# Patient Record
Sex: FEMALE | Race: WHITE | NOT HISPANIC OR LATINO | RURAL
[De-identification: names, ages, dates, MRNs, and addresses within clinical notes are randomized per-mention and may not be internally consistent; named-entity substitution may affect disease eponyms.]

---

## 2017-01-09 DIAGNOSIS — N92.0 EXCESSIVE OR FREQUENT MENSTRUATION: ICD-10-CM

## 2017-01-09 DIAGNOSIS — E66.9 OBESITY, UNSPECIFIED: ICD-10-CM

## 2017-01-09 DIAGNOSIS — D75.89 MACROCYTOSIS: Primary | ICD-10-CM

## 2017-01-09 DIAGNOSIS — Z13.6 SCREENING FOR ISCHEMIC HEART DISEASE: ICD-10-CM

## 2017-02-21 PROBLEM — Z00.00 WELLNESS EXAMINATION: Status: ACTIVE | Noted: 2017-02-21

## 2017-02-23 ENCOUNTER — OFFICE VISIT (OUTPATIENT)
Dept: FAMILY MEDICINE CLINIC | Facility: CLINIC | Age: 27
End: 2017-02-23

## 2017-02-23 VITALS
OXYGEN SATURATION: 99 % | TEMPERATURE: 99.2 F | HEIGHT: 59 IN | DIASTOLIC BLOOD PRESSURE: 68 MMHG | BODY MASS INDEX: 27.17 KG/M2 | SYSTOLIC BLOOD PRESSURE: 112 MMHG | HEART RATE: 70 BPM | WEIGHT: 134.8 LBS | RESPIRATION RATE: 16 BRPM

## 2017-02-23 DIAGNOSIS — F79 MENTAL RETARDATION: Chronic | ICD-10-CM

## 2017-02-23 DIAGNOSIS — Z00.00 ROUTINE CHECK-UP: Primary | ICD-10-CM

## 2017-02-23 DIAGNOSIS — I38 VALVULAR HEART DISEASE: Chronic | ICD-10-CM

## 2017-02-23 DIAGNOSIS — Q90.9 DS (DOWN'S SYNDROME): Chronic | ICD-10-CM

## 2017-02-23 PROCEDURE — 99213 OFFICE O/P EST LOW 20 MIN: CPT | Performed by: FAMILY MEDICINE

## 2017-02-23 RX ORDER — ACETAMINOPHEN AND CODEINE PHOSPHATE 120; 12 MG/5ML; MG/5ML
1 SOLUTION ORAL TAKE AS DIRECTED
COMMUNITY
Start: 2017-02-10

## 2017-02-23 NOTE — PROGRESS NOTES
"Subjective   Guillermina Mcintosh is a 26 y.o. female presenting with chief complaint of:   Chief Complaint   Patient presents with   • Follow-up     \"think she needs labs too?\"       History of Present Illness :  With aide.  Has multiple chronic problems; interval appointment.  One of these problems is Down's. With this mental retardation, congenital/valvular heart.  2m appoitnment/regulatory visit.       Other chronic problem/s to consider:  Allergic rhinitis:  This has been present for years/over a year.  The nasal/sinus congestion on/off has been present for years and is currently stable/ same as usual.   Chronic Otitis:  Has had since childhood. Has had many ENT/PE tubes.  Has hearing loss/aide related to.  Currently no ear pain, drainage.    Has an acute issue today: needs labs today    The following portions of the patient's history were reviewed and updated as appropriate: allergies, current medications, past family history, past medical history, past social history, past surgical history and problem list.  TCC migrated if needed/reviewed.      Current Outpatient Prescriptions:   •  Calcium Carbonate-Simethicone (MAALOX MAX PO), Take 15 mL by mouth 2 (Two) Times a Day As Needed., Disp: , Rfl:   •  fluticasone (FLONASE) 50 MCG/ACT nasal spray, 2 sprays into each nostril daily. Administer 2 sprays in each nostril for each dose., Disp: , Rfl:   •  norethindrone (MICRONOR) 0.35 MG tablet, Take 1 tablet by mouth Take As Directed., Disp: , Rfl:     No Known Allergies    Review of Systems  GENERAL:  Active/slower with limits, speed, samni for understanding, stature. Sleep is ok. No fever.  ENDO:  No syncope, near or diaphoretic sweaty spells..  HEENT: No head injury  headache,  No vision change,  Same hearing loss.  Ears without pain/drainage.  No sore throat.  No nasal/sinus congestion/drainage. No epistaxis.  CHEST: No chest wall tenderness or mass. No cough,  without wheeze, SOB; no hemoptysis.  CV: No chest pain, " palpatations, ankle edema.  GI: No heartburn, dysphagia.  No abdominal pain, diarrhea, constipation, rectal bleeding, or melena.    :  Voids without dysuria, or incontience to completion.  ORTHO: No painful/swollen joints but various on /off sore.  No  sore neck or back.  No acute neck or back pain without recent injury.   NEURO: No dizziness, weakness of extremities.  No numbness/parethesias.   PSYCH: Mod/major memory loss.  Mood good; does not appear anxious, depressed but/and not suicidal.  Tolerated stress.     Results for orders placed or performed in visit on 09/06/16   Basic metabolic panel   Result Value Ref Range    Glucose 91 70 - 100 mg/dL    BUN 15 5 - 21 mg/dL    Creatinine 0.88 0.5 - 1.4 mg/dL    eGFR Non African Am 106 mL/min/1.732    eGFR African Am 129 mL/min/1.732    BUN/Creatinine Ratio 17.0     Sodium 139 135 - 145 mmol/L    Potassium 4.0 3.5 - 5.3 mmol/L    Chloride 101 98 - 110 mmol/L    Total CO2 27 24 - 31 mmol/L    Calcium 9.1 8.4 - 10.4 mg/dL   CBC and Differential   Result Value Ref Range    WBC 6.21 4.80 - 10.80 K/mcL    RBC 4.41 (L) 4.80 - 5.90 M/mcL    Hemoglobin 14.7 14.0 - 18.0 g/dL    Hematocrit 43.3 40.0 - 52.0 %    MCV 98.2 (H) 82.0 - 95.0 fL    MCH 33.3 (H) 28.0 - 32.0 pg    MCHC 33.9 33.0 - 36.0 gm/dL    RDW 13.4 12 - 15 %    Platelets 247 130 - 400 K/mcL    Neutrophil Rel % 68.70 39.00 - 78.00 %    Lymphocyte Rel % 20.90 15.00 - 45.00 %    Monocyte Rel % 7.70 4.00 - 12.00 %    Eosinophil Rel % 1.90 0.00 - 4.00 %    Basophil Rel % 0.50 0.00 - 2.00 %    Neutrophils Absolute 4.26 1.87 - 8.40 K/mcL    Lymphocytes Absolute 1.30 0.72 - 4.86 K/mcL    Monocytes Absolute 0.48 0.19 - 1.30 K/mcL    Eosinophils Absolute 0.12 0.00 - 0.70 K/mcL    Basophils Absolute 0.03 0.00 - 0.20 K/mcL    Immature Grans Absolute 0.02 K/mcL    Hematology Comments: Comment        No results found for: HGBA1C    Lab Results   Component Value Date     09/06/2016    K 4.0 09/06/2016     09/06/2016  "   CO2 27 09/06/2016    BUN 15 09/06/2016    CREATININE 0.88 09/06/2016    CALCIUM 9.1 09/06/2016       No results found for: PSA     Objective   Visit Vitals   • /68 (BP Location: Right arm, Patient Position: Sitting, Cuff Size: Small Adult)   • Pulse 70   • Temp 99.2 °F (37.3 °C) (Oral)   • Resp 16   • Ht 59\" (149.9 cm)   • Wt 134 lb 12.8 oz (61.1 kg)   • LMP 01/30/2017 (Exact Date)   • SpO2 99%   • Breastfeeding No   • BMI 27.23 kg/m2       Physical Exam  GENERAL:  Well nourished/developed in no acute distress. Obese.  Down's appearance.   SKIN: Turgor excellent, without wound, rash, lesion.  HEENT: Normal cephalic without trauma.  Pupils equal round reactive to light. Extraocular motions full without nystagmus.   External canals nonobstructive nontender without reddness. Tymphatic membranes devonte/scarred with gian structures intact.   Oral cavity without growths, exudates, and moist.  Posterior pharnyx without mass, obstruction, reddness.  No thyroidmegaly, mass, tenderness, lymphadenopathy and supple.  CV: Regular rhythm.  1/6 systolic murmur without gallop,  edema. Posterior pulses intact.  No carotid bruits.  CHEST: No chest wall tenderness or mass.   LUNGS: Symmetric motion with clear to auscultation.   ABD: Soft, nontender without mass.   ORTHO: Symmetric extremities without swelling/point tenderness.  Full gross range of motion.  Short extremities.  NEURO: CN 2-12 grossly intact.  Symmetric facies. 14 x biceps  equal reflexes.  UE/LE   3/5 strength throughout.  Nonfocal use extremities. Speech mildly slurred/nasal.     PSYCH: Oriented x 3.  Pleasant calm, well kept.  Nonpurposeful- not directed conservation.    Assessment/Plan     1. Routine check-up  No overall change    2. Valvular heart disease  Cardiology followed/asymptomatic    3. DS (Down's syndrome)    4. Mental retardation  stable      Rx: reviewed.  Changes if above; none  LAB: reviewed/above, and if orders; today    Follow up: Return for " staff to call; will find out if needed 2m/vs 6.

## 2017-07-06 ENCOUNTER — OFFICE VISIT (OUTPATIENT)
Dept: FAMILY MEDICINE CLINIC | Facility: CLINIC | Age: 27
End: 2017-07-06

## 2017-07-06 VITALS
OXYGEN SATURATION: 97 % | BODY MASS INDEX: 27.42 KG/M2 | HEART RATE: 69 BPM | DIASTOLIC BLOOD PRESSURE: 72 MMHG | WEIGHT: 136 LBS | HEIGHT: 59 IN | SYSTOLIC BLOOD PRESSURE: 110 MMHG | RESPIRATION RATE: 20 BRPM | TEMPERATURE: 98.5 F

## 2017-07-06 DIAGNOSIS — Q90.9 DS (DOWN'S SYNDROME): Chronic | ICD-10-CM

## 2017-07-06 DIAGNOSIS — E55.9 VITAMIN D DEFICIENCY: ICD-10-CM

## 2017-07-06 DIAGNOSIS — I38 VALVULAR HEART DISEASE: Primary | Chronic | ICD-10-CM

## 2017-07-06 DIAGNOSIS — F79 MENTAL RETARDATION: Chronic | ICD-10-CM

## 2017-07-06 PROCEDURE — 99213 OFFICE O/P EST LOW 20 MIN: CPT | Performed by: FAMILY MEDICINE

## 2017-07-06 NOTE — PROGRESS NOTES
Subjective   Guillermina Mcintosh is a 27 y.o. female presenting with chief complaint of:   Chief Complaint   Patient presents with   • Annual Exam     Annual physical exam       History of Present Illness :  [facility]   Has multiple chronic problems; interval appointment.  One of these problems is Down's syndrome. This comes with a level of mental retardation and cognitive inabilities.  She is able to participate in the facility events and limited program employment.  She is pleased that she has a boyfriend.     Other chronic problem/s to consider:   MR: This has been present for over a year/it is chronic.  It is not worsening/progressive.  It is tolerated despite memory loss and cognetive decline. No falls  Congential cardiac disease: This has been present for years/over a year. It is chronic.  The problem is primarily congential;  Post repair AVSD  This is not associated with syncope/near syncope, dizziness, or weakness. [facility]   Allergic rhinitis:  This has been present for years/over a year.  The nasal/sinus congestion on/off has been present for years and is currently stable/ same as usual.   Gait decline: This has been present for years/over a year.  It is chronic.  It is influenced by strength/balance; no falls.   Vit D def: Her last lab work showed a low vitamin D and she has been on replacement since.      The following portions of the patient's history were reviewed and updated as appropriate: allergies, current medications, past family history, past medical history, past social history, past surgical history and problem list.  TCC migrated if needed/reviewed.      Current Outpatient Prescriptions:   •  Calcium Carbonate-Simethicone (MAALOX MAX PO), Take 15 mL by mouth 2 (Two) Times a Day As Needed., Disp: , Rfl:   •  fluticasone (FLONASE) 50 MCG/ACT nasal spray, 2 sprays into each nostril daily. Administer 2 sprays in each nostril  for each dose., Disp: , Rfl:   •  norethindrone (MICRONOR) 0.35 MG tablet, Take 1 tablet by mouth Take As Directed., Disp: , Rfl:   •  VITAMIN D, CHOLECALCIFEROL, PO, Take  by mouth Daily., Disp: , Rfl:     No Known Allergies    Review of Systems  GENERAL:  Active/slower with limits, speed, samni for age and congition. Sleep is ok.   ENDO:  No syncope, near or diaphoretic sweaty spells.  HEENT: No head injury pr headache,  No vision change,  Same hearing loss.  Ears without pain/drainage.  No sore throat.  No change occ nasal/sinus congestion/drainage. No epistaxis.  CHEST: No chest wall tenderness or mass. No cough,  without wheeze, SOB; no hemoptysis.  CV: No chest pain, palpatations, ankle edema.  GI: No heartburn, dysphagia.  No abdominal pain, diarrhea, constipation, rectal bleeding, or melena.    :  Voids without dysuria, or incontience to completion.  ORTHO: No painful/swollen joints but various on /off sore.  No sore neck or back.  No acute neck or back pain without recent injury.   NEURO: No dizziness, weakness of extremities.  No numbness/parethesias.   PSYCH: Mod  memory loss.  Mood good; not appearing anxious, depressed but/and not suicidal.  Tolerated stress.     Results for orders placed or performed in visit on 02/27/17   Comprehensive Metabolic Panel   Result Value Ref Range    Glucose 85 70 - 100 mg/dL    BUN 13 5 - 21 mg/dL    Creatinine 0.86 0.50 - 1.40 mg/dL    eGFR Non African Am 81 >60 mL/min/1.73    eGFR African Am 98 >60 mL/min/1.73    BUN/Creatinine Ratio 15.1 7.0 - 25.0    Sodium 141 135 - 145 mmol/L    Potassium 4.5 3.5 - 5.3 mmol/L    Chloride 107 98 - 110 mmol/L    Total CO2 22.0 (L) 24.0 - 31.0 mmol/L    Calcium 9.2 8.4 - 10.4 mg/dL    Total Protein 7.2 6.3 - 8.7 g/dL    Albumin 3.90 3.50 - 5.00 g/dL    Globulin 3.3 gm/dL    A/G Ratio 1.2 1.1 - 2.5 g/dL    Total Bilirubin 0.9 0.1 - 1.0 mg/dL    Alkaline Phosphatase 62 24 - 120 U/L    AST (SGOT) 27 7 - 45 U/L    ALT (SGPT) 29 0 - 54  U/L   Lipid Panel   Result Value Ref Range    Total Cholesterol 130 130 - 200 mg/dL    Triglycerides 86 0 - 149 mg/dL    HDL Cholesterol 45 (L) >=50 mg/dL    VLDL Cholesterol 17.2 mg/dL    LDL Cholesterol  68 0 - 99 mg/dL   TSH   Result Value Ref Range    TSH 1.400 0.470 - 4.680 mIU/mL   Vitamin D 25 Hydroxy   Result Value Ref Range    25 Hydroxy, Vitamin D <12.8 (L) 30.0 - 100.0 ng/mL   Vitamin B12   Result Value Ref Range    Vitamin B-12 642 239 - 931 pg/mL   Folate   Result Value Ref Range    Folate 19.60 >2.75 ng/mL   CBC & Differential   Result Value Ref Range    WBC 5.36 4.80 - 10.80 10*3/mm3    RBC 4.35 4.20 - 5.40 10*6/mm3    Hemoglobin 14.3 12.0 - 16.0 g/dL    Hematocrit 43.0 37.0 - 47.0 %    MCV 98.9 (H) 82.0 - 98.0 fL    MCH 32.9 (H) 28.0 - 32.0 pg    MCHC 33.3 33.0 - 36.0 g/dL    RDW 14.5 12.0 - 15.0 %    Platelets 234 130 - 400 10*3/mm3    Neutrophil Rel % 63.5 39.0 - 78.0 %    Lymphocyte Rel % 27.6 15.0 - 45.0 %    Monocyte Rel % 4.9 4.0 - 12.0 %    Eosinophil Rel % 3.2 0.0 - 4.0 %    Basophil Rel % 0.6 0.0 - 2.0 %    Neutrophils Absolute 3.41 1.87 - 8.40 10*3/mm3    Lymphocytes Absolute 1.48 0.72 - 4.86 10*3/mm3    Monocytes Absolute 0.26 0.19 - 1.30 10*3/mm3    Eosinophils Absolute 0.17 0.00 - 0.70 10*3/mm3    Basophils Absolute 0.03 0.00 - 0.20 10*3/mm3    Immature Granulocyte Rel % 0.2 0.0 - 5.0 %    Immature Grans Absolute 0.01 0.00 - 0.03 10*3/mm3       No results found for: HGBA1C    Lab Results   Component Value Date     02/28/2017     09/06/2016    K 4.5 02/28/2017    K 4.0 09/06/2016     02/28/2017     09/06/2016    CO2 22.0 (L) 02/28/2017    CO2 27 09/06/2016    BUN 13 02/28/2017    BUN 15 09/06/2016    CREATININE 0.86 02/28/2017    CREATININE 0.88 09/06/2016    CALCIUM 9.2 02/28/2017    CALCIUM 9.1 09/06/2016       No results found for: PSA     Lab Results:  CBC:    Lab Results - Last 18 Months  Lab Units 02/28/17  0721 09/06/16  0945   WBC 10*3/mm3 5.36 6.21  "  HEMATOCRIT % 43.0 43.3     CMP:    Lab Results - Last 18 Months  Lab Units 02/28/17  0721 09/06/16  0945   SODIUM mmol/L 141 139   CHLORIDE mmol/L 107 101   TOTAL CO2, ARTERIAL mmol/L 22.0* 27   BUN mg/dL 13 15   CREATININE mg/dL 0.86 0.88   EGFR IF NONAFRICN AM mL/min/1.73 81 106   EGFR IF AFRICN AM mL/min/1.73 98 129   CALCIUM mg/dL 9.2 9.1     THYROID:    Lab Results - Last 18 Months  Lab Units 02/28/17  0721   TSH mIU/mL 1.400     A1C:No results for input(s): HGBA1C in the last 11546 hours.    Objective   /72  Pulse 69  Temp 98.5 °F (36.9 °C) (Oral)   Resp 20  Ht 59\" (149.9 cm)  Wt 136 lb (61.7 kg)  SpO2 97%  BMI 27.47 kg/m2    Physical Exam  GENERAL:  Well nourished/developed in no acute distress.  Down's features.   SKIN: Turgor excellent, without wound, rash, lesion.  HEENT: Normal cephalic without trauma.  Pupils equal round reactive to light. Extraocular motions full without nystagmus.   External canals nonobstructive nontender without reddness.  Oral cavity without growths, exudates, and moist.  Posterior pharnyx without mass, obstruction, reddness.  No thyroidmegaly, mass, tenderness, lymphadenopathy and supple.  CV: Regular rhythm.  No murmur, gallop,  edema. No carotid bruit.  CHEST: No chest wall tenderness or mass.   LUNGS: Symmetric motion with clear to auscultation.   ABD: Soft, nontender without mass.   ORTHO: Symmetric extremities without swelling/point tenderness; short extremities  Full gross range of motion.  NEURO: CN 2-12 grossly intact.  Symmetric facies.   2-3/5 strength throughout.  Nonfocal use extremities. Speech slurred.   PSYCH:  Pleasant calm, well kept.  Nonpurposeful/directed conservation with intact short/long gross memory.     Assessment/Plan     1. Congential heart   Post AVSD repair    2. DS (Down's syndrome)    3. Mental retardation      Rx: reviewed.  Any other changes above and: same  LAB: reviewed/above.  Orders above and: 6/12m    There are no Patient " Instructions on file for this visit.    Follow up: Return in about 6 months (around 1/6/2018).

## 2017-11-21 ENCOUNTER — TELEPHONE (OUTPATIENT)
Dept: FAMILY MEDICINE CLINIC | Facility: CLINIC | Age: 27
End: 2017-11-21

## 2017-11-21 ENCOUNTER — CLINICAL SUPPORT (OUTPATIENT)
Dept: FAMILY MEDICINE CLINIC | Facility: CLINIC | Age: 27
End: 2017-11-21

## 2017-11-21 DIAGNOSIS — J02.9 SORE THROAT: Primary | ICD-10-CM

## 2017-11-21 LAB
EXPIRATION DATE: NORMAL
INTERNAL CONTROL: NORMAL
Lab: NORMAL
S PYO AG THROAT QL: NEGATIVE

## 2017-11-21 PROCEDURE — 87880 STREP A ASSAY W/OPTIC: CPT | Performed by: FAMILY MEDICINE

## 2017-11-21 PROCEDURE — 99211 OFF/OP EST MAY X REQ PHY/QHP: CPT | Performed by: FAMILY MEDICINE

## 2017-11-21 RX ORDER — AZITHROMYCIN 250 MG/1
TABLET, FILM COATED ORAL
Qty: 6 TABLET | Refills: 0 | Status: SHIPPED | OUTPATIENT
Start: 2017-11-21 | End: 2018-01-11

## 2017-11-21 NOTE — TELEPHONE ENCOUNTER
Nora Krytpon called and states that pt is being sent home from [facility] today because she is crying holding her throat telling them it is sore and would like for pt to be seen Nora

## 2018-01-11 ENCOUNTER — OFFICE VISIT (OUTPATIENT)
Dept: FAMILY MEDICINE CLINIC | Facility: CLINIC | Age: 28
End: 2018-01-11

## 2018-01-11 VITALS
WEIGHT: 139 LBS | HEIGHT: 59 IN | DIASTOLIC BLOOD PRESSURE: 70 MMHG | OXYGEN SATURATION: 98 % | RESPIRATION RATE: 18 BRPM | SYSTOLIC BLOOD PRESSURE: 120 MMHG | BODY MASS INDEX: 28.02 KG/M2 | TEMPERATURE: 98.8 F | HEART RATE: 78 BPM

## 2018-01-11 DIAGNOSIS — Z00.00 ROUTINE GENERAL MEDICAL EXAMINATION AT HEALTH CARE FACILITY: ICD-10-CM

## 2018-01-11 DIAGNOSIS — Q24.9 CONGENITAL HEART ANOMALY: Primary | Chronic | ICD-10-CM

## 2018-01-11 DIAGNOSIS — Q90.9 DS (DOWN'S SYNDROME): Chronic | ICD-10-CM

## 2018-01-11 DIAGNOSIS — F79 MENTAL RETARDATION: Chronic | ICD-10-CM

## 2018-01-11 PROCEDURE — 99213 OFFICE O/P EST LOW 20 MIN: CPT | Performed by: FAMILY MEDICINE

## 2018-01-11 NOTE — PROGRESS NOTES
Subjective   Guillermina Mcintosh is a 27 y.o. female presenting with chief complaint of:   Chief Complaint   Patient presents with   • Cardiac Valve Problem   • Down Syndrome       History of Present Illness :  with female aide from [place].  Here for primarily a/an Chronic issue today; regulatory visit.   Has has  multiple chronic problems to consider, is here for an interval appointment: one is down's syndrome.  Born with.  Early years spent with family; lives [place] for several years without any pattern acute problems.     Other chronic problem/s to consider:   Congenital heart disease.  Born with.  Had repair AVSD; follows with [facility] [facility].  ? That cardiologist that has seen her is leaving for [place]; ? To keep going.   Mental retardation:  Born with.  Above to some work.   Allergic rhinitis:  Chronic/asssociated with chronic OM. Uses flonase and helps.     Has an/another acute issue today: none.    The following portions of the patient's history were reviewed and updated as appropriate: allergies, current medications, past family history, past medical history, past social history, past surgical history and problem list.  Records acquired and reviewed; TCC migrated.      Current Outpatient Prescriptions:   •  Calcium Carbonate-Simethicone (MAALOX MAX PO), Take 15 mL by mouth 2 (Two) Times a Day As Needed., Disp: , Rfl:   •  fluticasone (FLONASE) 50 MCG/ACT nasal spray, 2 sprays into each nostril daily. Administer 2 sprays in each nostril for each dose., Disp: , Rfl:   •  norethindrone (MICRONOR) 0.35 MG tablet, Take 1 tablet by mouth Take As Directed., Disp: , Rfl:   •  VITAMIN D, CHOLECALCIFEROL, PO, Take  by mouth Daily., Disp: , Rfl:    No problems with her medications.     No Known Allergies    Review of Systems  GENERAL:  Active/slower with limits, speed, stamina for age and understanding. Sleep is ok. No fever now.  ENDO:  No syncope, near or diaphoretic sweaty spells;  Labs at [facility].  .  HEENT: No head injury  headache,  No vision change, Same hearing loss.  Ears without pain/drainage.  No sore throat.  No nasal/sinus congestion/drainage. No epistaxis.  CHEST: No chest wall tenderness or mass. No cough,  without wheeze.  No SOB; no hemoptysis.  CV: No chest pain, palpitations, ankle edema.  GI: No heartburn, dysphagia.  No abdominal pain, diarrhea, constipation.  No rectal bleeding, or melena.    :  Voids without dysuria, or  incontinence to completion.  GYN: Sees gyne.   ORTHO: No painful/swollen joints but various on /off sore.  No sore neck or back.  No acute neck or back pain without recent injury.  NEURO: No dizziness, weakness of extremities.  No numbness/paresthesias.   PSYCH: No memory loss.  Mood good; not anxious, depressed but/and not suicidal.     Results for orders placed or performed in visit on 11/21/17   POCT rapid strep A   Result Value Ref Range    Rapid Strep A Screen Negative Negative, VALID, INVALID, Not Performed    Internal Control Passed Passed    Lot Number [id number]     Expiration Date 8/2018        No results found for: HGBA1C    Lab Results   Component Value Date     02/28/2017     09/06/2016    K 4.5 02/28/2017    K 4.0 09/06/2016     02/28/2017     09/06/2016    CO2 22.0 (L) 02/28/2017    CO2 27 09/06/2016    BUN 13 02/28/2017    BUN 15 09/06/2016    CREATININE 0.86 02/28/2017    CREATININE 0.88 09/06/2016    CALCIUM 9.2 02/28/2017    CALCIUM 9.1 09/06/2016       No results found for: PSA     Lab Results:  CBC:    Lab Results - Last 18 Months  Lab Units 02/28/17  0721 09/06/16  0945   WBC 10*3/mm3 5.36 6.21   HEMATOCRIT % 43.0 43.3     CMP:    Lab Results - Last 18 Months  Lab Units 02/28/17  0721 09/06/16  0945   SODIUM mmol/L 141 139   CHLORIDE mmol/L 107 101   TOTAL CO2, ARTERIAL mmol/L 22.0* 27   BUN mg/dL 13 15   CREATININE mg/dL 0.86 0.88   EGFR IF NONAFRICN AM mL/min/1.73 81 106   EGFR IF AFRICN AM mL/min/1.73 98 129   CALCIUM mg/dL 9.2  "9.1     THYROID:    Lab Results - Last 18 Months  Lab Units 02/28/17  0721   TSH mIU/mL 1.400     A1C:No results for input(s): HGBA1C in the last 03604 hours.    Objective   /70  Pulse 78  Temp 98.8 °F (37.1 °C) (Oral)   Resp 18  Ht 149.9 cm (59\")  Wt 63 kg (139 lb)  LMP 12/20/2017 (Exact Date)  SpO2 98%  Breastfeeding? No  BMI 28.07 kg/m2    Physical Exam  GENERAL:  Well nourished/developed in no acute distress; down's features.  SKIN: Turgor excellent, without wound, rash, lesion  HEENT: Normal cephalic without trauma.  Pupils equal round reactive to light. Extraocular motions full without nystagmus.   External canals nonobstructive nontender without reddness. Tymphatic membranes devonte with gian structures intact.   Oral cavity without growths, exudates, and moist.  Posterior pharynx without mass, obstruction, redness.  No thyromegaly, mass, tenderness, lymphadenopathy and supple.  CV: Regular rhythm.  1/6 systolic murmur, without gallop,  edema. Posterior pulses intact.  No carotid bruits.  CHEST: No chest wall tenderness or mass.   LUNGS: Symmetric motion with clear to auscultation.  ABD: Soft, nontender without mass.   ORTHO: Symmetric extremities without swelling/point tenderness.  Full gross range of motion.  NEURO: CN 2-12 grossly intact.  Symmetric facies. 1/4 x biceps equal reflexes.  UE/LE   3/5 strength throughout.  Nonfocal use extremities. Speech clear.    PSYCH: Oriented x 3.  Pleasant calm, well kept.  Purposeful/directed conservation with intact short/long gross memory.     Assessment/Plan     1. Congenital heart anomaly-repaired AVSD    2. DS (Down's syndrome)    3. Routine general medical examination at health care facility    4. Mental retardation        Rx: reviewed/changes:  same    LAB: reviewed/orders:   6m CBC, CMP  12m CBC, CMP, LIPID, TSH, fT4, Vit D    Discussions:   Would encourage that you stick with [facility] clinic as long as they advise    There are " no Patient Instructions on file for this visit.    Follow up: Return for Dr Bray-, 6 m;.  Future Appointments  Date Time Provider Department Center   7/18/2018 9:45 AM Jose G Bray MD [facility] None   copy faxed to [place].

## 2018-01-12 PROBLEM — Z00.00 ROUTINE GENERAL MEDICAL EXAMINATION AT HEALTH CARE FACILITY: Status: ACTIVE | Noted: 2018-01-12

## 2018-01-12 PROBLEM — Z01.89 LABORATORY TEST: Status: ACTIVE | Noted: 2018-01-12

## 2018-03-14 RX ORDER — MELATONIN
1000 2 TIMES DAILY
Qty: 60 TABLET | Refills: 5 | Status: SHIPPED | OUTPATIENT
Start: 2018-03-14

## 2018-04-21 ENCOUNTER — OFFICE VISIT (OUTPATIENT)
Dept: RETAIL CLINIC | Facility: CLINIC | Age: 28
End: 2018-04-21

## 2018-04-21 VITALS
HEIGHT: 58 IN | WEIGHT: 141.6 LBS | HEART RATE: 61 BPM | OXYGEN SATURATION: 98 % | DIASTOLIC BLOOD PRESSURE: 60 MMHG | TEMPERATURE: 99 F | RESPIRATION RATE: 20 BRPM | SYSTOLIC BLOOD PRESSURE: 110 MMHG | BODY MASS INDEX: 29.72 KG/M2

## 2018-04-21 DIAGNOSIS — J30.9 ALLERGIC CONJUNCTIVITIS AND RHINITIS, BILATERAL: Primary | ICD-10-CM

## 2018-04-21 DIAGNOSIS — H10.13 ALLERGIC CONJUNCTIVITIS AND RHINITIS, BILATERAL: Primary | ICD-10-CM

## 2018-04-21 PROCEDURE — 99213 OFFICE O/P EST LOW 20 MIN: CPT | Performed by: ADVANCED PRACTICE MIDWIFE

## 2018-04-21 RX ORDER — LORATADINE 10 MG/1
10 TABLET ORAL DAILY
Qty: 30 TABLET | Refills: 0 | Status: SHIPPED | OUTPATIENT
Start: 2018-04-21

## 2018-04-21 NOTE — PROGRESS NOTES
Chief Complaint   Patient presents with   • Conjunctivitis     Subjective   Guillermina Mcintosh is a 27 y.o. female who presents to the clinic today with complaints   Conjunctivitis    The current episode started today. The onset was sudden. The problem occurs rarely. The problem has been unchanged. The problem is mild. Nothing relieves the symptoms. Nothing aggravates the symptoms. Associated symptoms include eye itching, rhinorrhea (clear nasal discharge), eye discharge (awoke this morning and eyes were matted) and eye redness. Pertinent negatives include no fever, no decreased vision, no double vision, no photophobia, no congestion, no ear discharge, no ear pain, no headaches, no sore throat and no eye pain. Severity: no pain. Both eyes are affected.The eye pain is not associated with movement. The eyelid exhibits redness.         Current Outpatient Prescriptions:   •  Calcium Carbonate-Simethicone (MAALOX MAX PO), Take 15 mL by mouth 2 (Two) Times a Day As Needed., Disp: , Rfl:   •  cholecalciferol (VITAMIN D3) 1000 units tablet, Take 1 tablet by mouth 2 (Two) Times a Day., Disp: 60 tablet, Rfl: 5  •  fluticasone (FLONASE) 50 MCG/ACT nasal spray, 2 sprays into each nostril daily. Administer 2 sprays in each nostril for each dose., Disp: , Rfl:   •  norethindrone (MICRONOR) 0.35 MG tablet, Take 1 tablet by mouth Take As Directed., Disp: , Rfl:     Allergies:  Review of patient's allergies indicates no known allergies.    Past Medical History:   Diagnosis Date   • Cardiac anomaly    • Down syndrome      Past Surgical History:   Procedure Laterality Date   • ATRIAL SEPTAL DEFECT REPAIR       History reviewed. No pertinent family history.  Social History   Substance Use Topics   • Smoking status: Never Smoker   • Smokeless tobacco: Never Used   • Alcohol use No       Review of Systems  Review of Systems   Constitutional: Negative for fever.   HENT: Positive for rhinorrhea (clear nasal discharge) and sneezing. Negative  "for congestion, ear discharge, ear pain and sore throat.    Eyes: Positive for discharge (awoke this morning and eyes were matted), redness and itching. Negative for double vision, photophobia, pain and visual disturbance.   Neurological: Negative for headaches.   All other systems reviewed and are negative.      Objective   /60 (BP Location: Left arm, Patient Position: Sitting, Cuff Size: Adult)   Pulse 61   Temp 99 °F (37.2 °C) (Tympanic)   Resp 20   Ht 147.3 cm (58\")   Wt 64.2 kg (141 lb 9.6 oz)   LMP  (LMP Unknown)   SpO2 98%   BMI 29.59 kg/m²       Physical Exam   Constitutional: She appears well-developed and well-nourished. She is cooperative. No distress.   HENT:   Head: Normocephalic.   Nose: Rhinorrhea (clear discharge bilaterally) present.   Mouth/Throat: Uvula is midline, oropharynx is clear and moist and mucous membranes are normal.   Eyes: EOM and lids are normal. Pupils are equal, round, and reactive to light. Right eye exhibits discharge (watery/clear ). Left eye exhibits discharge (watery/clear). Right conjunctiva is injected. Left conjunctiva is injected.   Cardiovascular: Normal rate, regular rhythm and normal heart sounds.    Pulmonary/Chest: Effort normal and breath sounds normal. No respiratory distress. She has no wheezes.   Neurological: She is alert.   Skin: Skin is warm and dry.   Psychiatric: She has a normal mood and affect. Her behavior is normal.       Assessment/Plan     Guillermina was seen today for conjunctivitis.    Diagnoses and all orders for this visit:    Allergic conjunctivitis and rhinitis, bilateral  -     loratadine (CLARITIN) 10 MG tablet; Take 1 tablet by mouth Daily.  -     naphazoline-pheniramine (NAPHCON-A) 0.025-0.3 % ophthalmic solution; Administer 2 drops to both eyes 4 (Four) Times a Day As Needed for Irritation.            See patient education instructions. Reviewed good handwashing and to not touch eyes if at all possible. If no better in the next " couple of days, f/u with PCP.

## 2018-04-21 NOTE — PATIENT INSTRUCTIONS
Allergic Conjunctivitis  A clear membrane (conjunctiva) covers the white part of your eye and the inner surface of your eyelid. Allergic conjunctivitis happens when this membrane has inflammation. This is caused by allergies. Common causes of allergic reactions (allergens)include:  · Outdoor allergens, such as:  ¨ Pollen.  ¨ Grass and weeds.  ¨ Mold spores.  · Indoor allergens, such as:  ¨ Dust.  ¨ Smoke.  ¨ Mold.  ¨ Pet dander.  ¨ Animal hair.  This condition can make your eye red or pink. It can also make your eye feel itchy. This condition cannot be spread from one person to another person (is not contagious).  Follow these instructions at home:  · Try not to be around things that you are allergic to.  · Take or apply over-the-counter and prescription medicines only as told by your doctor. These include any eye drops.  · Place a cool, clean washcloth on your eye for 10-20 minutes. Do this 3-4 times a day.  · Do not touch or rub your eyes.  · Do not wear contact lenses until the inflammation is gone. Wear glasses instead.  · Do not wear eye makeup until the inflammation is gone.  · Keep all follow-up visits as told by your doctor. This is important.  Contact a doctor if:  · Your symptoms get worse.  · Your symptoms do not get better with treatment.  · You have mild eye pain.  · You are sensitive to light,  · You have spots or blisters on your eyes.  · You have pus coming from your eye.  · You have a fever.  Get help right away if:  · You have redness, swelling, or other symptoms in only one eye.  · Your vision is blurry.  · You have vision changes.  · You have very bad eye pain.  Summary  · Allergic conjunctivitis is caused by allergies. It can make your eye red or pink, and it can make your eye feel itchy.  · This condition cannot be spread from one person to another person (is not contagious).  · Try not to be around things that you are allergic to.  · Take or apply over-the-counter and prescription medicines  only as told by your doctor. These include any eye drops.  · Contact your doctor if your symptoms get worse or they do not get better with treatment.  This information is not intended to replace advice given to you by your health care provider. Make sure you discuss any questions you have with your health care provider.  Document Released: 06/07/2011 Document Revised: 08/11/2017 Document Reviewed: 08/11/2017  [employer] Interactive Patient Education © 2017 [employer]  Allergic Rhinitis  Allergic rhinitis is when the mucous membranes in the nose respond to allergens. Allergens are particles in the air that cause your body to have an allergic reaction. This causes you to release allergic antibodies. Through a chain of events, these eventually cause you to release histamine into the blood stream. Although meant to protect the body, it is this release of histamine that causes your discomfort, such as frequent sneezing, congestion, and an itchy, runny nose.  What are the causes?  Seasonal allergic rhinitis (hay fever) is caused by pollen allergens that may come from grasses, trees, and weeds. Year-round allergic rhinitis (perennial allergic rhinitis) is caused by allergens such as house dust mites, pet dander, and mold spores.  What are the signs or symptoms?  · Nasal stuffiness (congestion).  · Itchy, runny nose with sneezing and tearing of the eyes.  How is this diagnosed?  Your health care provider can help you determine the allergen or allergens that trigger your symptoms. If you and your health care provider are unable to determine the allergen, skin or blood testing may be used. Your health care provider will diagnose your condition after taking your health history and performing a physical exam. Your health care provider may assess you for other related conditions, such as asthma, pink eye, or an ear infection.  How is this treated?  Allergic rhinitis does not have a cure, but it can be controlled  by:  · Medicines that block allergy symptoms. These may include allergy shots, nasal sprays, and oral antihistamines.  · Avoiding the allergen.  Hay fever may often be treated with antihistamines in pill or nasal spray forms. Antihistamines block the effects of histamine. There are over-the-counter medicines that may help with nasal congestion and swelling around the eyes. Check with your health care provider before taking or giving this medicine.  If avoiding the allergen or the medicine prescribed do not work, there are many new medicines your health care provider can prescribe. Stronger medicine may be used if initial measures are ineffective. Desensitizing injections can be used if medicine and avoidance does not work. Desensitization is when a patient is given ongoing shots until the body becomes less sensitive to the allergen. Make sure you follow up with your health care provider if problems continue.  Follow these instructions at home:  It is not possible to completely avoid allergens, but you can reduce your symptoms by taking steps to limit your exposure to them. It helps to know exactly what you are allergic to so that you can avoid your specific triggers.  Contact a health care provider if:  · You have a fever.  · You develop a cough that does not stop easily (persistent).  · You have shortness of breath.  · You start wheezing.  · Symptoms interfere with normal daily activities.  This information is not intended to replace advice given to you by your health care provider. Make sure you discuss any questions you have with your health care provider.  Document Released: 09/12/2002 Document Revised: 08/18/2017 Document Reviewed: 08/25/2014  [employer] Interactive Patient Education © 2017 [employer]  Loratadine tablets  What is this medicine?  LORATADINE (jaime AT a wilmer) is an antihistamine. It helps to relieve sneezing, runny nose, and itchy, watery eyes. This medicine is used to treat the symptoms of  allergies. It is also used to treat itchy skin rash and hives.  This medicine may be used for other purposes; ask your health care provider or pharmacist if you have questions.  COMMON BRAND NAME(S): Alavert, Allergy Relief, Claritin, Claritin Hives Relief, Clear-Atadine, QlearQuil All Day & All Night Allergy Relief, Tavist ND  What should I tell my health care provider before I take this medicine?  They need to know if you have any of these conditions:  -asthma  -kidney disease  -liver disease  -an unusual or allergic reaction to loratadine, other antihistamines, other medicines, foods, dyes, or preservatives  -pregnant or trying to get pregnant  -breast-feeding  How should I use this medicine?  Take this medicine by mouth with a glass of water. Follow the directions on the label. You may take this medicine with food or on an empty stomach. Take your medicine at regular intervals. Do not take your medicine more often than directed.  Talk to your pediatrician regarding the use of this medicine in children. While this medicine may be used in children as young as 6 years for selected conditions, precautions do apply.  Overdosage: If you think you have taken too much of this medicine contact a poison control center or emergency room at once.  NOTE: This medicine is only for you. Do not share this medicine with others.  What if I miss a dose?  If you miss a dose, take it as soon as you can. If it is almost time for your next dose, take only that dose. Do not take double or extra doses.  What may interact with this medicine?  -other medicines for colds or allergies  This list may not describe all possible interactions. Give your health care provider a list of all the medicines, herbs, non-prescription drugs, or dietary supplements you use. Also tell them if you smoke, drink alcohol, or use illegal drugs. Some items may interact with your medicine.  What should I watch for while using this medicine?  Tell your doctor or  healthcare professional if your symptoms do not start to get better or if they get worse.  Your mouth may get dry. Chewing sugarless gum or sucking hard candy, and drinking plenty of water may help. Contact your doctor if the problem does not go away or is severe.  You may get drowsy or dizzy. Do not drive, use machinery, or do anything that needs mental alertness until you know how this medicine affects you. Do not stand or sit up quickly, especially if you are an older patient. This reduces the risk of dizzy or fainting spells.  What side effects may I notice from receiving this medicine?  Side effects that you should report to your doctor or health care professional as soon as possible:  -allergic reactions like skin rash, itching or hives, swelling of the face, lips, or tongue  -breathing problems  -unusually restless or nervous  Side effects that usually do not require medical attention (report to your doctor or health care professional if they continue or are bothersome):  -drowsiness  -dry or irritated mouth or throat  -headache  This list may not describe all possible side effects. Call your doctor for medical advice about side effects. You may report side effects to FDA at 9-800-FDA-9035.  Where should I keep my medicine?  Keep out of the reach of children.  Store at room temperature between 2 and 30 degrees C (36 and 86 degrees F). Protect from moisture. Throw away any unused medicine after the expiration date.  NOTE: This sheet is a summary. It may not cover all possible information. If you have questions about this medicine, talk to your doctor, pharmacist, or health care provider.  © 2018 [employer] (2009-06-22 17:17:24)

## 2018-06-15 ENCOUNTER — TELEPHONE (OUTPATIENT)
Dept: FAMILY MEDICINE CLINIC | Facility: CLINIC | Age: 28
End: 2018-06-15

## 2018-06-15 RX ORDER — METRONIDAZOLE 250 MG/1
250 TABLET ORAL 3 TIMES DAILY
Qty: 21 TABLET | Refills: 0 | Status: SHIPPED | OUTPATIENT
Start: 2018-06-15 | End: 2018-06-22

## 2018-06-15 NOTE — TELEPHONE ENCOUNTER
"\"pt has a bad odor from her vaginal area for couple weeks, pt is scratching and does have vaginal discharge, staff has been making sure that she is washing well and using summers ling and other womens  hygiene products but is not helping\"    Is there anything that can be called into her pharmacy, for vaginal discharge, odor, itching?  "

## 2018-07-18 ENCOUNTER — OFFICE VISIT (OUTPATIENT)
Dept: FAMILY MEDICINE CLINIC | Facility: CLINIC | Age: 28
End: 2018-07-18

## 2018-07-18 VITALS
WEIGHT: 152 LBS | BODY MASS INDEX: 31.91 KG/M2 | TEMPERATURE: 98.4 F | HEART RATE: 74 BPM | DIASTOLIC BLOOD PRESSURE: 62 MMHG | SYSTOLIC BLOOD PRESSURE: 118 MMHG | HEIGHT: 58 IN | OXYGEN SATURATION: 98 % | RESPIRATION RATE: 18 BRPM

## 2018-07-18 DIAGNOSIS — E55.9 VITAMIN D DEFICIENCY: ICD-10-CM

## 2018-07-18 DIAGNOSIS — Q90.9 DS (DOWN'S SYNDROME): Chronic | ICD-10-CM

## 2018-07-18 DIAGNOSIS — R13.10 DYSPHAGIA, UNSPECIFIED TYPE: Primary | ICD-10-CM

## 2018-07-18 PROCEDURE — 99213 OFFICE O/P EST LOW 20 MIN: CPT | Performed by: FAMILY MEDICINE

## 2018-07-18 NOTE — PROGRESS NOTES
"Subjective   Guillermina Mcintosh is a 28 y.o. female presenting with chief complaint of:   Chief Complaint   Patient presents with   • Annual Exam     \"just here for check up\"       History of Present Illness :  With female aide.   Has multiple chronic problems to consider that might have a bearing on today's issues; regulatory and an interval appointment.       Chronic/acute problems to review today:   1. Dysphagia, unspecified type: chronic/past without any choking now.  Eats in hurry which contributes    2. DS (Down's syndrome): chronic with congential heart, mental retardation, others; no worsening of anything noted.    3. Vitamin D deficiency: chronic/observed with labs and replaced.       Has an/another acute issue today: none.    The following portions of the patient's history were reviewed and updated as appropriate: allergies, current medications, past family history, past medical history, past social history, past surgical history and problem list.  Records acquired and reviewed; TCC migrated.      Current Outpatient Prescriptions:   •  cholecalciferol (VITAMIN D3) 1000 units tablet, Take 1 tablet by mouth 2 (Two) Times a Day., Disp: 60 tablet, Rfl: 5  •  fluticasone (FLONASE) 50 MCG/ACT nasal spray, 2 sprays into each nostril daily. Administer 2 sprays in each nostril for each dose., Disp: , Rfl:   •  loratadine (CLARITIN) 10 MG tablet, Take 1 tablet by mouth Daily., Disp: 30 tablet, Rfl: 0  •  norethindrone (MICRONOR) 0.35 MG tablet, Take 1 tablet by mouth Take As Directed., Disp: , Rfl:   •  Calcium Carbonate-Simethicone (MAALOX MAX PO), Take 15 mL by mouth 2 (Two) Times a Day As Needed., Disp: , Rfl:   •  naphazoline-pheniramine (NAPHCON-A) 0.025-0.3 % ophthalmic solution, Administer 2 drops to both eyes 4 (Four) Times a Day As Needed for Irritation., Disp: 5 mL, Rfl: 0    No problems with medications.  Refills if needed done    No Known Allergies    Review of Systems  GENERAL:  Active/slower with limits, " speed, stamina for age and understanding. Sleep is ok. No fever now.  ENDO:  No syncope, near or diaphoretic sweaty spells;  Labs at [place]/reviewed when faxed.  HEENT: No head injury  headache,  No vision change, Same hearing loss.  Ears without pain/drainage.  No sore throat.  No nasal/sinus congestion/drainage. No epistaxis.  CHEST: No chest wall tenderness or mass. No cough,  without wheeze.  No SOB; no hemoptysis.  CV: No chest pain, palpitations, ankle edema.  GI: No heartburn, dysphagia.  No abdominal pain, diarrhea, constipation.  No rectal bleeding, or melena.    :  Voids without dysuria, or incontinence to completion.  GYN: Sees gyne.   ORTHO: No painful/swollen joints or sorness  No sore neck or back.  No acute neck or back pain without recent injury.  NEURO: No dizziness, weakness of extremities.  No numbness/paresthesias.   PSYCH: Mod memory loss.  Mood good; not anxious, depressed but/and not suicidal.     Screening:  Mammogram: NA  Bone density: NA  Low dose CT chest: NA  GI: NA    Results for orders placed or performed in visit on 11/21/17   POCT rapid strep A   Result Value Ref Range    Rapid Strep A Screen Negative Negative, VALID, INVALID, Not Performed    Internal Control Passed Passed    Lot Number [id number]     Expiration Date 8/2,018        No results found for: PSA     Lab Results:  CBC:    Lab Results - Last 18 Months  Lab Units 02/28/17  0721   WBC 10*3/mm3 5.36   HEMOGLOBIN g/dL 14.3   HEMATOCRIT % 43.0   PLATELETS 10*3/mm3 234      BMP/CMP:    Lab Results - Last 18 Months  Lab Units 02/28/17  0721   SODIUM mmol/L 141   POTASSIUM mmol/L 4.5   CHLORIDE mmol/L 107   TOTAL CO2, ARTERIAL mmol/L 22.0*   GLUCOSE mg/dL 85   BUN mg/dL 13   CREATININE mg/dL 0.86   EGFR IF NONAFRICN AM mL/min/1.73 81   EGFR IF AFRICN AM mL/min/1.73 98   CALCIUM mg/dL 9.2     HEPATIC:    Lab Results - Last 18 Months  Lab Units 02/28/17  0721   ALT (SGPT) U/L 29   AST (SGOT) U/L 27   ALK PHOS U/L 62  "    THYROID:    Lab Results - Last 18 Months  Lab Units 02/28/17  0721   TSH mIU/mL 1.400     A1C:No results for input(s): HGBA1C in the last 52773 hours.  PSA:No results for input(s): PSA in the last 94514 hours.    Objective   /62 (BP Location: Right arm, Patient Position: Sitting, Cuff Size: Adult)   Pulse 74   Temp 98.4 °F (36.9 °C) (Oral)   Resp 18   Ht 147.3 cm (58\")   Wt 68.9 kg (152 lb)   LMP 07/11/2018 (Approximate)   SpO2 98%   Breastfeeding? No   BMI 31.77 kg/m²   Body mass index is 31.77 kg/m².    Physical Exam  GENERAL:  Well nourished/developed in no acute distress; down's features.  SKIN: Turgor excellent, without wound, rash, lesion  HEENT: Normal cephalic without trauma.  Pupils equal round reactive to light. Extraocular motions full without nystagmus.   External canals nonobstructive nontender without reddness. Tymphatic membranes devonte with gian structures intact.   Oral cavity without growths, exudates, and moist.  Posterior pharynx without mass, obstruction, redness.  No thyromegaly, mass, tenderness, lymphadenopathy and supple.  CV: Regular rhythm.  1/6 systolic murmur, without gallop,  edema. Posterior pulses intact.  No carotid bruits.  CHEST: No chest wall tenderness or mass.   LUNGS: Symmetric motion with clear to auscultation.  ABD: Soft, nontender without mass.   ORTHO: Symmetric extremities without swelling/point tenderness.  Full gross range of motion.  NEURO: CN 2-12 grossly intact.  Symmetric facies. 1/4 x biceps equal reflexes.  UE/LE   3/5 strength throughout.  Nonfocal use extremities. Speech slurred.   PSYCH: Oriented x ?.  Pleasant calm, well kept.  Non-purposeful/directed conservation with ? intact short/long gross memory.     Assessment/Plan     1. Dysphagia, unspecified type    2. DS (Down's syndrome)    3. Vitamin D deficiency        Rx: reviewed/changes:  No change    LAB/Testing/Referrals: reviewed/orders:   Today: keep up with heart MDs yearly  No orders of " the defined types were placed in this encounter.    Usual:   6m CBC, CMP  12m CBC, CMP, LIPID, TSH, fT4, Vit D    Discussions:     Body mass index is 31.77 kg/m².   Patient's Body mass index is 31.77 kg/m². BMI is within normal parameters. No follow-up required. (so short)  Non-smoker      Patient Instructions   Be sure she keeps her heart exam yearly      Follow up: Return for Dr Bray-.  Future Appointments  Date Time Provider Department Center   10/19/2018 9:30 AM Jose G Bray MD [facility] None

## 2018-12-07 NOTE — TELEPHONE ENCOUNTER
Flagyl 250mg tid x7 days   DISPLAY PLAN FREE TEXT DISPLAY PLAN FREE TEXT DISPLAY PLAN FREE TEXT DISPLAY PLAN FREE TEXT DISPLAY PLAN FREE TEXT DISPLAY PLAN FREE TEXT DISPLAY PLAN FREE TEXT DISPLAY PLAN FREE TEXT DISPLAY PLAN FREE TEXT DISPLAY PLAN FREE TEXT DISPLAY PLAN FREE TEXT DISPLAY PLAN FREE TEXT

## 2020-03-22 ENCOUNTER — NURSE TRIAGE (OUTPATIENT)
Dept: CALL CENTER | Facility: HOSPITAL | Age: 30
End: 2020-03-22

## 2020-03-22 NOTE — TELEPHONE ENCOUNTER
Reviewed guideline with caller, advises home care. Ms Mcintosh lives in a group home with 4 residents. Advised to call PCP on Monday to ask if they advise her to be tested. Spoke with  Nora Cuadra and apprised her of care advice.     Reason for Disposition  • [1] Fever AND [2] no signs of serious infection or localizing symptoms (all other triage questions negative)    Additional Information  • Negative: Shock suspected (e.g., cold/pale/clammy skin, too weak to stand, low BP, rapid pulse)  • Negative: Difficult to awaken or acting confused (e.g., disoriented, slurred speech)  • Negative: [1] Difficulty breathing AND [2] bluish lips, tongue or face  • Negative: New onset rash with multiple purple (or blood-colored) spots or dots  • Negative: Sounds like a life-threatening emergency to the triager  • Negative: Fever in a cancer patient who is currently (or recently) receiving chemotherapy or radiation therapy, or cancer patient who has metastatic or end-stage cancer and is receiving palliative care  • Negative: Pregnant  • Negative: Postpartum (from 0 to 6 weeks after delivery)  • Negative: Fever onset within 24 hours of receiving vaccine  • Negative: [1] Fever AND [2] within 21 days of Ebola EXPOSURE  • Negative: Other symptom is present, see that guideline  (e.g., symptoms of cough, runny nose, sore throat, earache, abdominal pain, diarrhea, vomiting)  • Negative: [1] Headache AND [2] stiff neck (can't touch chin to chest)  • Negative: Difficulty breathing  • Negative: IV drug abuse  • Negative: [1] Drinking very little AND [2] dehydration suspected (e.g., no urine > 12 hours, very dry mouth, very lightheaded)  • Negative: Patient sounds very sick or weak to the triager  (Exception: mild weakness and hasn't taken fever medicine)  • Negative: Fever > 104 F (40 C)  • Negative: [1] Fever > 101 F (38.3 C) AND [2] age > 60  • Negative: [1] Fever > 100.0 F (37.8 C) AND [2] bedridden (e.g., nursing home patient,  "CVA, chronic illness, recovering from surgery)  • Negative: [1] Fever > 100.0 F (37.8 C) AND [2] indwelling urinary catheter (e.g., Chaidez, Coude)  • Negative: [1] Fever > 100.0 F (37.8 C) AND [2] has port (portacath), central line, or PICC line  • Negative: [1] Fever > 100.0 F (37.8 C) AND [2] diabetes mellitus or weak immune system (e.g., HIV positive, cancer chemo, splenectomy, organ transplant, chronic steroids)  • Negative: [1] Fever > 100.0 F (37.8 C) AND [2] surgery in the last month  • Negative: Transplant patient (e.g., kidney, liver, lung, heart)  • Negative: Fever present > 3 days (72 hours)  • Negative: [1] Fever > 100.0 F (37.8 C) AND [2] foreign travel to a developing country in the last month  • Negative: [1] Intermittent fever > 100.0 F (37.8 C) AND [2] lasts > 3 weeks    Answer Assessment - Initial Assessment Questions  1. TEMPERATURE: \"What is the most recent temperature?\"  \"How was it measured?\"       100 otic thermometer   2. ONSET: \"When did the fever start?\"       Saturday  3. SYMPTOMS: \"Do you have any other symptoms besides the fever?\"  (e.g., colds, headache, sore throat, earache, cough, rash, diarrhea, vomiting, abdominal pain)      Cough, runny nose, sore throat  4. CAUSE: If there are no symptoms, ask: \"What do you think is causing the fever?\"       unknown  5. CONTACTS: \"Does anyone else in the family have an infection?\"      No   6. TREATMENT: \"What have you done so far to treat this fever?\" (e.g., medications)      Tylenol  7. IMMUNOCOMPROMISE: \"Do you have of the following: diabetes, HIV positive, splenectomy, cancer chemotherapy, chronic steroid treatment, transplant patient, etc.\"      no  8. PREGNANCY: \"Is there any chance you are pregnant?\" \"When was your last menstrual period?\"      no  9. TRAVEL: \"Have you traveled out of the country in the last month?\" (e.g., travel history, exposures)      no    Protocols used: FEVER-ADULT-AH      "

## 2020-03-23 NOTE — TELEPHONE ENCOUNTER
Attempted to call number listed and getting a fax signal, will try to find number to her [employer] house

## 2020-03-23 NOTE — TELEPHONE ENCOUNTER
Called Melody at [facility]'s home.  Did have slight cough.  Denies dysuria.     COVID19 has many presentations; some folks will likely be mild/other quite severe    100.4 temp is a criteria  Usually see cough, body aches too    Still with no ability to test her; best she stay in the facility for 10 days;   They were ok with that; as she stays home anyway.     Will call if more symptoms.

## 2020-03-23 NOTE — TELEPHONE ENCOUNTER
"Called and spoke to Mala Duglas \"she hasn't had a fever since Sunday it was 100.4, today it is 98.0, she if fine, she doesn't have a cough or anything. We had her in her room, the last day she was at [facility] was last Monday. She is due to start her period anytime\"  "

## 2023-07-29 ENCOUNTER — HOSPITAL ENCOUNTER (OUTPATIENT)
Age: 33
Setting detail: OBSERVATION
Discharge: HOME | End: 2023-07-30
Attending: EMERGENCY MEDICINE | Admitting: STUDENT IN AN ORGANIZED HEALTH CARE EDUCATION/TRAINING PROGRAM
Payer: MEDICARE

## 2023-07-29 ENCOUNTER — APPOINTMENT (OUTPATIENT)
Dept: GENERAL RADIOLOGY | Age: 33
End: 2023-07-29
Payer: MEDICARE

## 2023-07-29 DIAGNOSIS — Q90.9 DOWN SYNDROME: ICD-10-CM

## 2023-07-29 DIAGNOSIS — R07.9 CHEST PAIN, UNSPECIFIED TYPE: Primary | ICD-10-CM

## 2023-07-29 DIAGNOSIS — R00.1 BRADYCARDIA, SEVERE SINUS: ICD-10-CM

## 2023-07-29 LAB
ALBUMIN SERPL-MCNC: 3.5 G/DL (ref 3.5–5.2)
ALP SERPL-CCNC: 71 U/L (ref 35–104)
ALT SERPL-CCNC: 15 U/L (ref 5–33)
ANION GAP SERPL CALCULATED.3IONS-SCNC: 8 MMOL/L (ref 7–19)
AST SERPL-CCNC: 19 U/L (ref 5–32)
BASOPHILS # BLD: 0.1 K/UL (ref 0–0.2)
BASOPHILS NFR BLD: 1.3 % (ref 0–1)
BILIRUB SERPL-MCNC: <0.2 MG/DL (ref 0.2–1.2)
BUN SERPL-MCNC: 21 MG/DL (ref 6–20)
CALCIUM SERPL-MCNC: 8.8 MG/DL (ref 8.6–10)
CHLORIDE SERPL-SCNC: 104 MMOL/L (ref 98–111)
CO2 SERPL-SCNC: 23 MMOL/L (ref 22–29)
CREAT SERPL-MCNC: 0.9 MG/DL (ref 0.5–0.9)
D DIMER PPP FEU-MCNC: 0.36 UG/ML FEU (ref 0–0.48)
EOSINOPHIL # BLD: 0.1 K/UL (ref 0–0.6)
EOSINOPHIL NFR BLD: 1.8 % (ref 0–5)
ERYTHROCYTE [DISTWIDTH] IN BLOOD BY AUTOMATED COUNT: 14.5 % (ref 11.5–14.5)
GLUCOSE SERPL-MCNC: 80 MG/DL (ref 74–109)
HCG SERPL QL: NEGATIVE
HCT VFR BLD AUTO: 46.3 % (ref 37–47)
HGB BLD-MCNC: 14.5 G/DL (ref 12–16)
IMM GRANULOCYTES # BLD: 0 K/UL
LYMPHOCYTES # BLD: 1.9 K/UL (ref 1.1–4.5)
LYMPHOCYTES NFR BLD: 33.2 % (ref 20–40)
MCH RBC QN AUTO: 32.2 PG (ref 27–31)
MCHC RBC AUTO-ENTMCNC: 31.3 G/DL (ref 33–37)
MCV RBC AUTO: 102.7 FL (ref 81–99)
MONOCYTES # BLD: 0.4 K/UL (ref 0–0.9)
MONOCYTES NFR BLD: 7.9 % (ref 0–10)
NEUTROPHILS # BLD: 3.1 K/UL (ref 1.5–7.5)
NEUTS SEG NFR BLD: 55.1 % (ref 50–65)
PLATELET # BLD AUTO: 266 K/UL (ref 130–400)
PMV BLD AUTO: 9.6 FL (ref 9.4–12.3)
POTASSIUM SERPL-SCNC: 4.8 MMOL/L (ref 3.5–5)
PROT SERPL-MCNC: 7.5 G/DL (ref 6.6–8.7)
RBC # BLD AUTO: 4.51 M/UL (ref 4.2–5.4)
SODIUM SERPL-SCNC: 135 MMOL/L (ref 136–145)
TROPONIN T SERPL-MCNC: <0.01 NG/ML (ref 0–0.03)
TSH SERPL DL<=0.005 MIU/L-ACNC: 2.08 UIU/ML (ref 0.27–4.2)
WBC # BLD AUTO: 5.6 K/UL (ref 4.8–10.8)

## 2023-07-29 PROCEDURE — 99285 EMERGENCY DEPT VISIT HI MDM: CPT

## 2023-07-29 PROCEDURE — 84484 ASSAY OF TROPONIN QUANT: CPT

## 2023-07-29 PROCEDURE — 36415 COLL VENOUS BLD VENIPUNCTURE: CPT

## 2023-07-29 PROCEDURE — 2580000003 HC RX 258: Performed by: EMERGENCY MEDICINE

## 2023-07-29 PROCEDURE — 2580000003 HC RX 258

## 2023-07-29 PROCEDURE — 84443 ASSAY THYROID STIM HORMONE: CPT

## 2023-07-29 PROCEDURE — 71045 X-RAY EXAM CHEST 1 VIEW: CPT

## 2023-07-29 PROCEDURE — G0378 HOSPITAL OBSERVATION PER HR: HCPCS

## 2023-07-29 PROCEDURE — 93005 ELECTROCARDIOGRAM TRACING: CPT | Performed by: EMERGENCY MEDICINE

## 2023-07-29 PROCEDURE — 84703 CHORIONIC GONADOTROPIN ASSAY: CPT

## 2023-07-29 PROCEDURE — 6370000000 HC RX 637 (ALT 250 FOR IP): Performed by: EMERGENCY MEDICINE

## 2023-07-29 PROCEDURE — 6360000002 HC RX W HCPCS: Performed by: EMERGENCY MEDICINE

## 2023-07-29 PROCEDURE — 85379 FIBRIN DEGRADATION QUANT: CPT

## 2023-07-29 PROCEDURE — 80053 COMPREHEN METABOLIC PANEL: CPT

## 2023-07-29 PROCEDURE — 6370000000 HC RX 637 (ALT 250 FOR IP)

## 2023-07-29 PROCEDURE — 96374 THER/PROPH/DIAG INJ IV PUSH: CPT

## 2023-07-29 PROCEDURE — 85025 COMPLETE CBC W/AUTO DIFF WBC: CPT

## 2023-07-29 RX ORDER — ACETAMINOPHEN 650 MG/1
650 SUPPOSITORY RECTAL EVERY 6 HOURS PRN
Status: DISCONTINUED | OUTPATIENT
Start: 2023-07-29 | End: 2023-07-30 | Stop reason: HOSPADM

## 2023-07-29 RX ORDER — SODIUM CHLORIDE 9 MG/ML
INJECTION, SOLUTION INTRAVENOUS PRN
Status: DISCONTINUED | OUTPATIENT
Start: 2023-07-29 | End: 2023-07-30 | Stop reason: HOSPADM

## 2023-07-29 RX ORDER — FLUTICASONE PROPIONATE 50 MCG
1 SPRAY, SUSPENSION (ML) NASAL DAILY
Status: DISCONTINUED | OUTPATIENT
Start: 2023-07-29 | End: 2023-07-30 | Stop reason: HOSPADM

## 2023-07-29 RX ORDER — FLUTICASONE PROPIONATE 50 MCG
1 SPRAY, SUSPENSION (ML) NASAL DAILY
COMMUNITY

## 2023-07-29 RX ORDER — NITROGLYCERIN 0.4 MG/1
0.4 TABLET SUBLINGUAL EVERY 5 MIN PRN
Status: DISCONTINUED | OUTPATIENT
Start: 2023-07-29 | End: 2023-07-30 | Stop reason: HOSPADM

## 2023-07-29 RX ORDER — VITAMIN B COMPLEX
1000 TABLET ORAL DAILY
Status: DISCONTINUED | OUTPATIENT
Start: 2023-07-29 | End: 2023-07-30 | Stop reason: HOSPADM

## 2023-07-29 RX ORDER — ACETAMINOPHEN 500 MG
1000 TABLET ORAL
Status: COMPLETED | OUTPATIENT
Start: 2023-07-29 | End: 2023-07-29

## 2023-07-29 RX ORDER — ACETAMINOPHEN AND CODEINE PHOSPHATE 120; 12 MG/5ML; MG/5ML
1 SOLUTION ORAL DAILY
Status: DISCONTINUED | OUTPATIENT
Start: 2023-07-29 | End: 2023-07-30 | Stop reason: HOSPADM

## 2023-07-29 RX ORDER — SODIUM CHLORIDE 0.9 % (FLUSH) 0.9 %
5-40 SYRINGE (ML) INJECTION EVERY 12 HOURS SCHEDULED
Status: DISCONTINUED | OUTPATIENT
Start: 2023-07-29 | End: 2023-07-30 | Stop reason: HOSPADM

## 2023-07-29 RX ORDER — ATORVASTATIN CALCIUM 40 MG/1
40 TABLET, FILM COATED ORAL NIGHTLY
Status: DISCONTINUED | OUTPATIENT
Start: 2023-07-29 | End: 2023-07-30 | Stop reason: HOSPADM

## 2023-07-29 RX ORDER — ONDANSETRON 2 MG/ML
4 INJECTION INTRAMUSCULAR; INTRAVENOUS EVERY 6 HOURS PRN
Status: DISCONTINUED | OUTPATIENT
Start: 2023-07-29 | End: 2023-07-30 | Stop reason: HOSPADM

## 2023-07-29 RX ORDER — 0.9 % SODIUM CHLORIDE 0.9 %
1000 INTRAVENOUS SOLUTION INTRAVENOUS ONCE
Status: COMPLETED | OUTPATIENT
Start: 2023-07-29 | End: 2023-07-29

## 2023-07-29 RX ORDER — SODIUM CHLORIDE 0.9 % (FLUSH) 0.9 %
5-40 SYRINGE (ML) INJECTION PRN
Status: DISCONTINUED | OUTPATIENT
Start: 2023-07-29 | End: 2023-07-30 | Stop reason: HOSPADM

## 2023-07-29 RX ORDER — KETOROLAC TROMETHAMINE 30 MG/ML
15 INJECTION, SOLUTION INTRAMUSCULAR; INTRAVENOUS ONCE
Status: COMPLETED | OUTPATIENT
Start: 2023-07-29 | End: 2023-07-29

## 2023-07-29 RX ORDER — ACETAMINOPHEN AND CODEINE PHOSPHATE 120; 12 MG/5ML; MG/5ML
1 SOLUTION ORAL DAILY
COMMUNITY

## 2023-07-29 RX ORDER — ONDANSETRON 4 MG/1
4 TABLET, ORALLY DISINTEGRATING ORAL EVERY 8 HOURS PRN
Status: DISCONTINUED | OUTPATIENT
Start: 2023-07-29 | End: 2023-07-30 | Stop reason: HOSPADM

## 2023-07-29 RX ORDER — POLYETHYLENE GLYCOL 3350 17 G/17G
17 POWDER, FOR SOLUTION ORAL DAILY PRN
Status: DISCONTINUED | OUTPATIENT
Start: 2023-07-29 | End: 2023-07-30 | Stop reason: HOSPADM

## 2023-07-29 RX ORDER — ASPIRIN 81 MG/1
81 TABLET, CHEWABLE ORAL DAILY
Status: DISCONTINUED | OUTPATIENT
Start: 2023-07-30 | End: 2023-07-30 | Stop reason: HOSPADM

## 2023-07-29 RX ORDER — ENOXAPARIN SODIUM 100 MG/ML
40 INJECTION SUBCUTANEOUS EVERY 24 HOURS
Status: DISCONTINUED | OUTPATIENT
Start: 2023-07-29 | End: 2023-07-30

## 2023-07-29 RX ORDER — ACETAMINOPHEN 325 MG/1
650 TABLET ORAL EVERY 6 HOURS PRN
Status: DISCONTINUED | OUTPATIENT
Start: 2023-07-29 | End: 2023-07-30 | Stop reason: HOSPADM

## 2023-07-29 RX ADMIN — SODIUM CHLORIDE 1000 ML: 9 INJECTION, SOLUTION INTRAVENOUS at 11:45

## 2023-07-29 RX ADMIN — SODIUM CHLORIDE, PRESERVATIVE FREE 10 ML: 5 INJECTION INTRAVENOUS at 23:34

## 2023-07-29 RX ADMIN — ATORVASTATIN CALCIUM 40 MG: 40 TABLET, FILM COATED ORAL at 23:34

## 2023-07-29 RX ADMIN — KETOROLAC TROMETHAMINE 15 MG: 30 INJECTION, SOLUTION INTRAMUSCULAR at 13:04

## 2023-07-29 RX ADMIN — ACETAMINOPHEN 1000 MG: 500 TABLET ORAL at 11:21

## 2023-07-29 ASSESSMENT — ENCOUNTER SYMPTOMS
FACIAL SWELLING: 0
NAUSEA: 0
SINUS PRESSURE: 0
CONSTIPATION: 0
COUGH: 0
DIARRHEA: 0
VOICE CHANGE: 0
CHOKING: 0
EYE DISCHARGE: 0
APNEA: 0
SHORTNESS OF BREATH: 0
BLOOD IN STOOL: 0
SORE THROAT: 0

## 2023-07-29 ASSESSMENT — HEART SCORE: ECG: 0

## 2023-07-29 NOTE — ED NOTES
Report called to [facility] on 5th floor      Sherley Del Angel [place]  07/29/23 6971

## 2023-07-29 NOTE — H&P
[facility] - History & Physical      PCP: Paulino Morrow    Date of Admission: 7/29/2023    Date of Service: 7/29/2023    Chief Complaint:  chest pain     History Of Present Illness: The patient is a 34 y.o. female who presented to [facility] ER with PMH anxiety, congential heart disease, s/p open heart repair, down syndrome complaining of chest pain. Unable to provide HPI obtained from ER documentation along with caregiver at bedside. Patient is a resident a [facility] in [place], [place]. Staff state she had complaints of chest pain last evening, gave her Tylenol, and continued to complain this morning thus presented for further evaluation. Caregiver denies recent infection, syncopal event, fall or trauma. Currently patient is resting comfortably in bed, pleasant, and in no acute distress. Work up in [address] [place] [address] no acute cardiopulmonary process, troponin negative, and EKG SB no acute ischemic changes. Patient is to be observed by hospitalist service. Past Medical History:        Diagnosis Date    Allergic rhinitis     Anxiety     Congenital heart disease     Down syndrome        Past Surgical History:        Procedure Laterality Date    TONSILLECTOMY      TYMPANOSTOMY TUBE PLACEMENT         Home Medications:  Prior to Admission medications    Medication Sig Start Date End Date Taking? Authorizing Provider   norethindrone (GALLO) 0.35 MG tablet Take 1 tablet by mouth daily   Yes Historical Provider, MD   vitamin D (CHOLECALCIFEROL) 25 MCG (1000 UT) TABS tablet Take 1 tablet by mouth daily   Yes Historical Provider, MD   fluticasone (FLONASE) 50 MCG/ACT nasal spray 1 spray by Each Nostril route daily   Yes Historical Provider, MD       Allergies:    Patient has no known allergies. Social History:    The patient currently lives home  Tobacco:   has no history on file for tobacco use. Alcohol:   has no history on file for alcohol use.   Illicit Drugs: denies    Family History:  No family

## 2023-07-29 NOTE — ED PROVIDER NOTES
[facility] 5 SURG SERVICES  eMERGENCY dEPARTMENT eNCOUnter      Pt Name: Moses Cole  MRN: [id number]  [address] [place] 1990  Date of evaluation: 7/29/2023  Provider: Ofelia Kahn MD    CHIEF COMPLAINT       Chief Complaint   Patient presents with    Chest Pain         HISTORY OF PRESENT ILLNESS   (Location/Symptom, Timing/Onset,Context/Setting, Quality, Duration, Modifying Factors, Severity)  Note limiting factors. Moses Cole is a 34 y.o. female who presents to the emergency department for evaluation of chest pain    72-year-old female with Down syndrome is a resident at the [facility] in [facility]. Had a history of congenital heart disease and had open heart surgery for repair of what I think is a atrial septal defect but I do not have any documented records of that. She had seen cardiology here with an echocardiogram done in 2011. She does not smoke. She is a difficult historian. But she is pleasant. Complaining of left-sided chest pain. No known injury. No reports of fever or infectious symptoms. She is here with her caregiver who gives more than the patient but still limited history. The history is provided by the patient, a caregiver and medical records. NursingNotes were reviewed. REVIEW OF SYSTEMS    (2-9 systems for level 4, 10 or more for level 5)     Review of Systems   Unable to perform ROS: Other (Patient with Down syndrome and cognitive challenges. Most of the history comes from caregiver.)   Constitutional:  Negative for chills, diaphoresis and fever. HENT:  Negative for congestion, drooling, facial swelling, nosebleeds, sinus pressure, sore throat and voice change. Eyes:  Negative for discharge. Respiratory:  Negative for apnea, cough, choking and shortness of breath. Cardiovascular:  Negative for chest pain and leg swelling. Gastrointestinal:  Negative for blood in stool, constipation, diarrhea and nausea.    Genitourinary:  Negative for

## 2023-07-30 VITALS
HEIGHT: 59 IN | BODY MASS INDEX: 50.4 KG/M2 | SYSTOLIC BLOOD PRESSURE: 108 MMHG | OXYGEN SATURATION: 97 % | WEIGHT: 250 LBS | DIASTOLIC BLOOD PRESSURE: 67 MMHG | RESPIRATION RATE: 20 BRPM | TEMPERATURE: 98.4 F | HEART RATE: 58 BPM

## 2023-07-30 PROBLEM — R07.89 ATYPICAL CHEST PAIN: Status: ACTIVE | Noted: 2023-07-29

## 2023-07-30 LAB
ANION GAP SERPL CALCULATED.3IONS-SCNC: 8 MMOL/L (ref 7–19)
BUN SERPL-MCNC: 19 MG/DL (ref 6–20)
CALCIUM SERPL-MCNC: 8.3 MG/DL (ref 8.6–10)
CHLORIDE SERPL-SCNC: 107 MMOL/L (ref 98–111)
CHOLEST SERPL-MCNC: 116 MG/DL (ref 160–199)
CO2 SERPL-SCNC: 23 MMOL/L (ref 22–29)
CREAT SERPL-MCNC: 1 MG/DL (ref 0.5–0.9)
ERYTHROCYTE [DISTWIDTH] IN BLOOD BY AUTOMATED COUNT: 14.4 % (ref 11.5–14.5)
GLUCOSE SERPL-MCNC: 97 MG/DL (ref 74–109)
HCT VFR BLD AUTO: 39.3 % (ref 37–47)
HDLC SERPL-MCNC: 38 MG/DL (ref 65–121)
HGB BLD-MCNC: 12.7 G/DL (ref 12–16)
LDLC SERPL CALC-MCNC: 64 MG/DL
LV EF: 55 %
LVEF MODALITY: NORMAL
MAGNESIUM SERPL-MCNC: 2.1 MG/DL (ref 1.6–2.6)
MCH RBC QN AUTO: 32.3 PG (ref 27–31)
MCHC RBC AUTO-ENTMCNC: 32.3 G/DL (ref 33–37)
MCV RBC AUTO: 100 FL (ref 81–99)
PHOSPHATE SERPL-MCNC: 3.9 MG/DL (ref 2.5–4.5)
PLATELET # BLD AUTO: 241 K/UL (ref 130–400)
PMV BLD AUTO: 9.6 FL (ref 9.4–12.3)
POTASSIUM SERPL-SCNC: 4.2 MMOL/L (ref 3.5–5)
RBC # BLD AUTO: 3.93 M/UL (ref 4.2–5.4)
SODIUM SERPL-SCNC: 138 MMOL/L (ref 136–145)
TRIGL SERPL-MCNC: 71 MG/DL (ref 0–149)
TROPONIN T SERPL-MCNC: <0.01 NG/ML (ref 0–0.03)
WBC # BLD AUTO: 5.3 K/UL (ref 4.8–10.8)

## 2023-07-30 PROCEDURE — 83735 ASSAY OF MAGNESIUM: CPT

## 2023-07-30 PROCEDURE — 84484 ASSAY OF TROPONIN QUANT: CPT

## 2023-07-30 PROCEDURE — 80048 BASIC METABOLIC PNL TOTAL CA: CPT

## 2023-07-30 PROCEDURE — 84100 ASSAY OF PHOSPHORUS: CPT

## 2023-07-30 PROCEDURE — 6360000002 HC RX W HCPCS

## 2023-07-30 PROCEDURE — G0378 HOSPITAL OBSERVATION PER HR: HCPCS

## 2023-07-30 PROCEDURE — 85027 COMPLETE CBC AUTOMATED: CPT

## 2023-07-30 PROCEDURE — 6360000004 HC RX CONTRAST MEDICATION

## 2023-07-30 PROCEDURE — 80061 LIPID PANEL: CPT

## 2023-07-30 PROCEDURE — 94760 N-INVAS EAR/PLS OXIMETRY 1: CPT

## 2023-07-30 PROCEDURE — C8929 TTE W OR WO FOL WCON,DOPPLER: HCPCS

## 2023-07-30 PROCEDURE — 93005 ELECTROCARDIOGRAM TRACING: CPT

## 2023-07-30 PROCEDURE — 36415 COLL VENOUS BLD VENIPUNCTURE: CPT

## 2023-07-30 PROCEDURE — 96372 THER/PROPH/DIAG INJ SC/IM: CPT

## 2023-07-30 PROCEDURE — 6370000000 HC RX 637 (ALT 250 FOR IP)

## 2023-07-30 RX ORDER — ENOXAPARIN SODIUM 100 MG/ML
30 INJECTION SUBCUTANEOUS 2 TIMES DAILY
Status: DISCONTINUED | OUTPATIENT
Start: 2023-07-30 | End: 2023-07-30 | Stop reason: HOSPADM

## 2023-07-30 RX ADMIN — PERFLUTREN 1.5 ML: 6.52 INJECTION, SUSPENSION INTRAVENOUS at 11:01

## 2023-07-30 RX ADMIN — ENOXAPARIN SODIUM 30 MG: 100 INJECTION SUBCUTANEOUS at 09:05

## 2023-07-30 RX ADMIN — FLUTICASONE PROPIONATE 1 SPRAY: 50 SPRAY, METERED NASAL at 09:05

## 2023-07-30 RX ADMIN — Medication 1000 UNITS: at 09:05

## 2023-07-30 RX ADMIN — ASPIRIN 81 MG: 81 TABLET, CHEWABLE ORAL at 09:05

## 2023-07-30 NOTE — PROGRESS NOTES
Automatic Dose Adjustment of                Subcutaneous Anticoagulant for Prophylaxis    Cheryl Goldberg is a 34 y.o. female. Recent Labs     07/29/23  0952 07/30/23  0250   CREATININE 0.9 1.0*       Estimated Creatinine Clearance: 96 mL/min (A) (based on SCr of 1 mg/dL (H)). Weight:  Wt Readings from Last 1 Encounters:   07/29/23 250 lb (113.4 kg)           Pharmacy has adjusted subcutaneous anticoagulant for prophylaxis to Enoxaparin 30 mg SC twice daily based on the patient's weight and estimated CrCl per [id number] [address] [facility],[address] policy.                Electronically signed by Myron Casper [facility] on 7/30/2023 at 5:29 AM

## 2023-07-31 LAB
EKG P AXIS: 11 DEGREES
EKG P AXIS: 34 DEGREES
EKG P-R INTERVAL: 180 MS
EKG P-R INTERVAL: 196 MS
EKG Q-T INTERVAL: 418 MS
EKG Q-T INTERVAL: 434 MS
EKG QRS DURATION: 122 MS
EKG QRS DURATION: 124 MS
EKG QTC CALCULATION (BAZETT): 406 MS
EKG QTC CALCULATION (BAZETT): 418 MS
EKG T AXIS: -10 DEGREES
EKG T AXIS: -18 DEGREES

## 2023-07-31 PROCEDURE — 93010 ELECTROCARDIOGRAM REPORT: CPT | Performed by: INTERNAL MEDICINE

## 2023-09-17 ENCOUNTER — APPOINTMENT (OUTPATIENT)
Dept: GENERAL RADIOLOGY | Age: 33
End: 2023-09-17
Payer: MEDICARE

## 2023-09-17 ENCOUNTER — HOSPITAL ENCOUNTER (EMERGENCY)
Age: 33
Discharge: HOME | End: 2023-09-17
Payer: MEDICARE

## 2023-09-17 VITALS
RESPIRATION RATE: 17 BRPM | BODY MASS INDEX: 50.4 KG/M2 | WEIGHT: 250 LBS | TEMPERATURE: 98.2 F | DIASTOLIC BLOOD PRESSURE: 71 MMHG | SYSTOLIC BLOOD PRESSURE: 98 MMHG | OXYGEN SATURATION: 98 % | HEIGHT: 59 IN | HEART RATE: 54 BPM

## 2023-09-17 DIAGNOSIS — R07.89 ATYPICAL CHEST PAIN: Primary | ICD-10-CM

## 2023-09-17 DIAGNOSIS — R00.1 BRADYCARDIA: ICD-10-CM

## 2023-09-17 DIAGNOSIS — I95.1 ORTHOSTATIC HYPOTENSION: ICD-10-CM

## 2023-09-17 LAB
ALBUMIN SERPL-MCNC: 3.9 G/DL (ref 3.5–5.2)
ALP SERPL-CCNC: 75 U/L (ref 35–104)
ALT SERPL-CCNC: 13 U/L (ref 5–33)
ANION GAP SERPL CALCULATED.3IONS-SCNC: 10 MMOL/L (ref 7–19)
AST SERPL-CCNC: 17 U/L (ref 5–32)
BASOPHILS # BLD: 0.1 K/UL (ref 0–0.2)
BASOPHILS NFR BLD: 1 % (ref 0–1)
BILIRUB SERPL-MCNC: 0.3 MG/DL (ref 0.2–1.2)
BNP BLD-MCNC: 194 PG/ML (ref 0–124)
BUN SERPL-MCNC: 22 MG/DL (ref 6–20)
CALCIUM SERPL-MCNC: 8.9 MG/DL (ref 8.6–10)
CHLORIDE SERPL-SCNC: 104 MMOL/L (ref 98–111)
CO2 SERPL-SCNC: 24 MMOL/L (ref 22–29)
CREAT SERPL-MCNC: 0.9 MG/DL (ref 0.5–0.9)
D DIMER PPP FEU-MCNC: 0.4 UG/ML FEU (ref 0–0.48)
EOSINOPHIL # BLD: 0 K/UL (ref 0–0.6)
EOSINOPHIL NFR BLD: 0.5 % (ref 0–5)
ERYTHROCYTE [DISTWIDTH] IN BLOOD BY AUTOMATED COUNT: 13.6 % (ref 11.5–14.5)
GLUCOSE SERPL-MCNC: 77 MG/DL (ref 74–109)
HCG SERPL QL: NEGATIVE
HCT VFR BLD AUTO: 44.8 % (ref 37–47)
HGB BLD-MCNC: 14.8 G/DL (ref 12–16)
IMM GRANULOCYTES # BLD: 0 K/UL
LYMPHOCYTES # BLD: 1.1 K/UL (ref 1.1–4.5)
LYMPHOCYTES NFR BLD: 14.6 % (ref 20–40)
MCH RBC QN AUTO: 33 PG (ref 27–31)
MCHC RBC AUTO-ENTMCNC: 33 G/DL (ref 33–37)
MCV RBC AUTO: 100 FL (ref 81–99)
MONOCYTES # BLD: 0.5 K/UL (ref 0–0.9)
MONOCYTES NFR BLD: 6.2 % (ref 0–10)
NEUTROPHILS # BLD: 6 K/UL (ref 1.5–7.5)
NEUTS SEG NFR BLD: 77.4 % (ref 50–65)
PLATELET # BLD AUTO: 262 K/UL (ref 130–400)
PMV BLD AUTO: 9.5 FL (ref 9.4–12.3)
POTASSIUM SERPL-SCNC: 4.7 MMOL/L (ref 3.5–5)
PROT SERPL-MCNC: 7.5 G/DL (ref 6.6–8.7)
RBC # BLD AUTO: 4.48 M/UL (ref 4.2–5.4)
SODIUM SERPL-SCNC: 138 MMOL/L (ref 136–145)
TROPONIN T SERPL-MCNC: <0.01 NG/ML (ref 0–0.03)
TROPONIN T SERPL-MCNC: <0.01 NG/ML (ref 0–0.03)
WBC # BLD AUTO: 7.7 K/UL (ref 4.8–10.8)

## 2023-09-17 PROCEDURE — 83880 ASSAY OF NATRIURETIC PEPTIDE: CPT

## 2023-09-17 PROCEDURE — 84484 ASSAY OF TROPONIN QUANT: CPT

## 2023-09-17 PROCEDURE — 99285 EMERGENCY DEPT VISIT HI MDM: CPT

## 2023-09-17 PROCEDURE — 85379 FIBRIN DEGRADATION QUANT: CPT

## 2023-09-17 PROCEDURE — 2580000003 HC RX 258: Performed by: PHYSICIAN ASSISTANT

## 2023-09-17 PROCEDURE — 93005 ELECTROCARDIOGRAM TRACING: CPT | Performed by: PHYSICIAN ASSISTANT

## 2023-09-17 PROCEDURE — 80053 COMPREHEN METABOLIC PANEL: CPT

## 2023-09-17 PROCEDURE — 96360 HYDRATION IV INFUSION INIT: CPT

## 2023-09-17 PROCEDURE — 36415 COLL VENOUS BLD VENIPUNCTURE: CPT

## 2023-09-17 PROCEDURE — 93246 EXT ECG>7D<15D RECORDING: CPT

## 2023-09-17 PROCEDURE — 85025 COMPLETE CBC W/AUTO DIFF WBC: CPT

## 2023-09-17 PROCEDURE — 71045 X-RAY EXAM CHEST 1 VIEW: CPT

## 2023-09-17 PROCEDURE — 84703 CHORIONIC GONADOTROPIN ASSAY: CPT

## 2023-09-17 PROCEDURE — 96361 HYDRATE IV INFUSION ADD-ON: CPT

## 2023-09-17 RX ORDER — 0.9 % SODIUM CHLORIDE 0.9 %
500 INTRAVENOUS SOLUTION INTRAVENOUS ONCE
Status: COMPLETED | OUTPATIENT
Start: 2023-09-17 | End: 2023-09-17

## 2023-09-17 RX ORDER — 0.9 % SODIUM CHLORIDE 0.9 %
1000 INTRAVENOUS SOLUTION INTRAVENOUS ONCE
Status: COMPLETED | OUTPATIENT
Start: 2023-09-17 | End: 2023-09-17

## 2023-09-17 RX ADMIN — SODIUM CHLORIDE 1000 ML: 9 INJECTION, SOLUTION INTRAVENOUS at 10:21

## 2023-09-17 RX ADMIN — SODIUM CHLORIDE 500 ML: 9 INJECTION, SOLUTION INTRAVENOUS at 14:29

## 2023-09-17 ASSESSMENT — PAIN SCALES - WONG BAKER: WONGBAKER_NUMERICALRESPONSE: 0

## 2023-09-17 ASSESSMENT — ENCOUNTER SYMPTOMS
ABDOMINAL PAIN: 0
SORE THROAT: 0
EYE PAIN: 0
COUGH: 0
EYE DISCHARGE: 0
APNEA: 0
COLOR CHANGE: 0
NAUSEA: 0
ABDOMINAL DISTENTION: 0
BACK PAIN: 0
SHORTNESS OF BREATH: 0
RHINORRHEA: 0
PHOTOPHOBIA: 0

## 2023-09-17 ASSESSMENT — PAIN - FUNCTIONAL ASSESSMENT
PAIN_FUNCTIONAL_ASSESSMENT: WONG-BAKER FACES
PAIN_FUNCTIONAL_ASSESSMENT: NONE - DENIES PAIN

## 2023-09-17 NOTE — DISCHARGE INSTRUCTIONS
Zio patch and hydration with slow transition call cardiologist jada tomorrow  We feel she is stable but likely needing intervention on medication for pressure and rate

## 2023-09-17 NOTE — ED PROVIDER NOTES
[address] [facility]  [phone]    If symptoms worsen    Kim Salazar  [address] [facility]  Amanda Ireland [phone]      As needed      DISCHARGE MEDICATIONS:  Discharge Medication List as of 9/17/2023  6:31 PM          (Please note that portions of this note were completed with a voice recognition program.  Efforts were made to edit the dictations but occasionallywords are mis-transcribed.)    Greta Homans, 1917 [address]  09/18/23 1821

## 2023-09-17 NOTE — ED NOTES
Pt placed on cardiac monitor, NIBP and continuous pulse ox. Alarms are on and audible.       Thelda Kehr, RN  09/17/23 6088

## 2023-09-17 NOTE — ED NOTES
Pt's HR continues to stay in the mid 40s-high 50s. [facility]Y notified and aware. No further orders at this time.       Cortez Pisano RN  09/17/23 0269

## 2023-09-18 LAB
EKG P AXIS: 22 DEGREES
EKG P AXIS: 45 DEGREES
EKG P-R INTERVAL: 178 MS
EKG P-R INTERVAL: 188 MS
EKG Q-T INTERVAL: 416 MS
EKG Q-T INTERVAL: 454 MS
EKG QRS DURATION: 120 MS
EKG QRS DURATION: 124 MS
EKG QTC CALCULATION (BAZETT): 419 MS
EKG QTC CALCULATION (BAZETT): 433 MS
EKG T AXIS: -16 DEGREES
EKG T AXIS: -21 DEGREES

## 2023-09-18 PROCEDURE — 93010 ELECTROCARDIOGRAM REPORT: CPT | Performed by: INTERNAL MEDICINE
